# Patient Record
Sex: MALE | Race: WHITE
[De-identification: names, ages, dates, MRNs, and addresses within clinical notes are randomized per-mention and may not be internally consistent; named-entity substitution may affect disease eponyms.]

---

## 2022-12-07 ENCOUNTER — HOSPITAL ENCOUNTER (EMERGENCY)
Dept: HOSPITAL 4 - SED | Age: 32
Discharge: HOME | End: 2022-12-07
Payer: MEDICAID

## 2022-12-07 VITALS — HEIGHT: 69 IN | BODY MASS INDEX: 32.58 KG/M2 | WEIGHT: 220 LBS

## 2022-12-07 VITALS — SYSTOLIC BLOOD PRESSURE: 119 MMHG

## 2022-12-07 DIAGNOSIS — Z48.00: Primary | ICD-10-CM

## 2022-12-07 DIAGNOSIS — Z79.899: ICD-10-CM

## 2022-12-07 NOTE — NUR
VS stable. discharge papers given. All questions answered. 

Given rx of bacitracin

DC home. ambulatory w steady gait. respirations non labored.

## 2022-12-07 NOTE — NUR
Patient given written and verbal discharge instructions and verbalizes 
understanding.  ER MD discussed with patient the results and treatment 
provided. Patient in stable condition. ID arm band removed. 

Rx of BACITRACIN EYE given. Patient educated on pain management and to follow 
up with PMD. Pain Scale 0/10.

Opportunity for questions provided and answered. Medication side effect fact 
sheet provided.